# Patient Record
Sex: MALE | Race: BLACK OR AFRICAN AMERICAN | NOT HISPANIC OR LATINO | URBAN - METROPOLITAN AREA
[De-identification: names, ages, dates, MRNs, and addresses within clinical notes are randomized per-mention and may not be internally consistent; named-entity substitution may affect disease eponyms.]

---
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
## 2022-01-02 ENCOUNTER — EMERGENCY (EMERGENCY)
Facility: HOSPITAL | Age: 22
LOS: 0 days | Discharge: HOME | End: 2022-01-02
Payer: MEDICAID

## 2022-01-02 VITALS
HEART RATE: 61 BPM | TEMPERATURE: 98 F | DIASTOLIC BLOOD PRESSURE: 80 MMHG | OXYGEN SATURATION: 98 % | SYSTOLIC BLOOD PRESSURE: 126 MMHG | HEIGHT: 76 IN | WEIGHT: 270.07 LBS | RESPIRATION RATE: 19 BRPM

## 2022-01-02 DIAGNOSIS — Z20.822 CONTACT WITH AND (SUSPECTED) EXPOSURE TO COVID-19: ICD-10-CM

## 2022-01-02 DIAGNOSIS — U07.1 COVID-19: ICD-10-CM

## 2022-01-02 DIAGNOSIS — F90.9 ATTENTION-DEFICIT HYPERACTIVITY DISORDER, UNSPECIFIED TYPE: ICD-10-CM

## 2022-01-02 PROCEDURE — 99282 EMERGENCY DEPT VISIT SF MDM: CPT

## 2022-01-02 NOTE — ED PROVIDER NOTE - PATIENT PORTAL LINK FT
You can access the FollowMyHealth Patient Portal offered by [facility] by registering at the following website: [email or web address]. By joining [employer]’s FollowMyHealth portal, you will also be able to view your health information using other applications (apps) compatible with our system.

## 2022-01-02 NOTE — ED PROVIDER NOTE - OBJECTIVE STATEMENT
21y Male with no sig PMHx presents to the ER for covid test. Denies symptoms at this time. Had symptoms last week. requesting test to go back to work.

## 2022-01-02 NOTE — ED PROVIDER NOTE - ADDITIONAL NOTES AND INSTRUCTIONS:
If covid negative, may return to work once symptoms resolve. If covid positive, must quarantine for 5 days then may return to work if symptoms resolve but must wear mask for additional 5 days while around other people.

## 2022-01-02 NOTE — ED PROVIDER NOTE - NSICDXPASTMEDICALHX_GEN_ALL_CORE_FT
PAST MEDICAL HISTORY:  ADHD (attention deficit hyperactivity disorder)     Adopted By maternal aunt at 13years of age.    Slipped capital femoral epiphysis

## 2022-01-02 NOTE — ED PROVIDER NOTE - NSFOLLOWUPINSTRUCTIONS_ED_ALL_ED_FT
Today you were seen in the ER for covid swab.    Take Motrin 600 mg every 8 hours as needed for moderate pain or fevers -- take with food.    Take Tylenol 650mg (Two 325 mg pills) every 4-6 hours as needed for pain or fevers.    Quarantine until you receive a text with the results of your viral swab.     Continue all previously prescribed medications as directed unless otherwise instructed.     Follow up with your primary care physician in 48-72 hours- bring copies of your results.     Return to the ER for worsening or persistent symptoms, and/or ANY NEW OR CONCERNING SYMPTOMS including but not limited to persistent fever, chest pain, shortness of breath, difficulty breathing, inability to tolerate food/water.

## 2022-01-02 NOTE — ED ADULT NURSE NOTE - OBJECTIVE STATEMENT
pt requesting a covid test for work. A&Ox3. states he had flu like symptoms a few days ago. no symptoms at this time.

## 2022-01-02 NOTE — ED PROVIDER NOTE - NS ED ROS FT
Constitutional: (-) Fever, (-) Chills  Skin: (-) Rashes  Eyes: (-) Visual changes, (-) Discharge, (-) Redness  Ears: (-) Hearing loss, (-)Tinnitus, (-) Ear pain  Nose: (-) Nasal congestion, (-) Runny nose  Mouth/Throat: (-) Sore throat  CV: (-) Chest pain  Resp: (-) Cough, (-) Shortness of breath, (-) Dyspnea on Exertion, (-) Wheezing  GI: (-) Abdominal pain, (-) Nausea, (-) Vomiting, (-) Diarrhea  : (-) Dysuria   MSK: (-) Myalgias  Neuro: (-) Headache

## 2022-01-02 NOTE — ED PROVIDER NOTE - CLINICAL SUMMARY MEDICAL DECISION MAKING FREE TEXT BOX
21y Male with no sig PMHx presents to the ER for covid test. Denies symptoms at this time. Vital sign stable. Will swab and dc

## 2022-01-03 LAB — SARS-COV-2 RNA SPEC QL NAA+PROBE: DETECTED

## 2022-04-25 ENCOUNTER — INPATIENT (INPATIENT)
Facility: HOSPITAL | Age: 22
LOS: 1 days | Discharge: HOME | End: 2022-04-27
Attending: SURGERY | Admitting: SURGERY
Payer: MEDICAID

## 2022-04-25 VITALS
WEIGHT: 274.92 LBS | HEIGHT: 76 IN | DIASTOLIC BLOOD PRESSURE: 77 MMHG | TEMPERATURE: 98 F | OXYGEN SATURATION: 99 % | RESPIRATION RATE: 18 BRPM | SYSTOLIC BLOOD PRESSURE: 121 MMHG | HEART RATE: 75 BPM

## 2022-04-25 LAB
ALBUMIN SERPL ELPH-MCNC: 3.8 G/DL — SIGNIFICANT CHANGE UP (ref 3.3–5)
ALP SERPL-CCNC: 86 U/L — SIGNIFICANT CHANGE UP (ref 40–120)
ALT FLD-CCNC: 34 U/L — SIGNIFICANT CHANGE UP (ref 12–78)
ANION GAP SERPL CALC-SCNC: 4 MMOL/L — LOW (ref 5–17)
APPEARANCE UR: CLEAR — SIGNIFICANT CHANGE UP
AST SERPL-CCNC: 17 U/L — SIGNIFICANT CHANGE UP (ref 15–37)
BASOPHILS # BLD AUTO: 0.03 K/UL — SIGNIFICANT CHANGE UP (ref 0–0.2)
BASOPHILS NFR BLD AUTO: 0.3 % — SIGNIFICANT CHANGE UP (ref 0–2)
BILIRUB SERPL-MCNC: 1.2 MG/DL — SIGNIFICANT CHANGE UP (ref 0.2–1.2)
BILIRUB UR-MCNC: NEGATIVE — SIGNIFICANT CHANGE UP
BUN SERPL-MCNC: 10 MG/DL — SIGNIFICANT CHANGE UP (ref 7–23)
CALCIUM SERPL-MCNC: 9 MG/DL — SIGNIFICANT CHANGE UP (ref 8.5–10.1)
CHLORIDE SERPL-SCNC: 102 MMOL/L — SIGNIFICANT CHANGE UP (ref 96–108)
CO2 SERPL-SCNC: 29 MMOL/L — SIGNIFICANT CHANGE UP (ref 22–31)
COLOR SPEC: YELLOW — SIGNIFICANT CHANGE UP
CREAT SERPL-MCNC: 1.09 MG/DL — SIGNIFICANT CHANGE UP (ref 0.5–1.3)
DIFF PNL FLD: NEGATIVE — SIGNIFICANT CHANGE UP
EGFR: 100 ML/MIN/1.73M2 — SIGNIFICANT CHANGE UP
EOSINOPHIL # BLD AUTO: 0.06 K/UL — SIGNIFICANT CHANGE UP (ref 0–0.5)
EOSINOPHIL NFR BLD AUTO: 0.6 % — SIGNIFICANT CHANGE UP (ref 0–6)
GLUCOSE SERPL-MCNC: 91 MG/DL — SIGNIFICANT CHANGE UP (ref 70–99)
GLUCOSE UR QL: NEGATIVE MG/DL — SIGNIFICANT CHANGE UP
HCT VFR BLD CALC: 43.4 % — SIGNIFICANT CHANGE UP (ref 39–50)
HGB BLD-MCNC: 14.2 G/DL — SIGNIFICANT CHANGE UP (ref 13–17)
IMM GRANULOCYTES NFR BLD AUTO: 0.3 % — SIGNIFICANT CHANGE UP (ref 0–1.5)
KETONES UR-MCNC: NEGATIVE — SIGNIFICANT CHANGE UP
LEUKOCYTE ESTERASE UR-ACNC: NEGATIVE — SIGNIFICANT CHANGE UP
LIDOCAIN IGE QN: 78 U/L — SIGNIFICANT CHANGE UP (ref 73–393)
LYMPHOCYTES # BLD AUTO: 1.19 K/UL — SIGNIFICANT CHANGE UP (ref 1–3.3)
LYMPHOCYTES # BLD AUTO: 12.8 % — LOW (ref 13–44)
MCHC RBC-ENTMCNC: 28.8 PG — SIGNIFICANT CHANGE UP (ref 27–34)
MCHC RBC-ENTMCNC: 32.7 G/DL — SIGNIFICANT CHANGE UP (ref 32–36)
MCV RBC AUTO: 88 FL — SIGNIFICANT CHANGE UP (ref 80–100)
MONOCYTES # BLD AUTO: 1.17 K/UL — HIGH (ref 0–0.9)
MONOCYTES NFR BLD AUTO: 12.6 % — SIGNIFICANT CHANGE UP (ref 2–14)
NEUTROPHILS # BLD AUTO: 6.79 K/UL — SIGNIFICANT CHANGE UP (ref 1.8–7.4)
NEUTROPHILS NFR BLD AUTO: 73.4 % — SIGNIFICANT CHANGE UP (ref 43–77)
NITRITE UR-MCNC: NEGATIVE — SIGNIFICANT CHANGE UP
NRBC # BLD: 0 /100 WBCS — SIGNIFICANT CHANGE UP (ref 0–0)
PH UR: 7 — SIGNIFICANT CHANGE UP (ref 5–8)
PLATELET # BLD AUTO: 231 K/UL — SIGNIFICANT CHANGE UP (ref 150–400)
POTASSIUM SERPL-MCNC: 4.1 MMOL/L — SIGNIFICANT CHANGE UP (ref 3.5–5.3)
POTASSIUM SERPL-SCNC: 4.1 MMOL/L — SIGNIFICANT CHANGE UP (ref 3.5–5.3)
PROT SERPL-MCNC: 7.6 GM/DL — SIGNIFICANT CHANGE UP (ref 6–8.3)
PROT UR-MCNC: NEGATIVE MG/DL — SIGNIFICANT CHANGE UP
RBC # BLD: 4.93 M/UL — SIGNIFICANT CHANGE UP (ref 4.2–5.8)
RBC # FLD: 12.9 % — SIGNIFICANT CHANGE UP (ref 10.3–14.5)
SODIUM SERPL-SCNC: 135 MMOL/L — SIGNIFICANT CHANGE UP (ref 135–145)
SP GR SPEC: 1.01 — SIGNIFICANT CHANGE UP (ref 1.01–1.02)
UROBILINOGEN FLD QL: NEGATIVE MG/DL — SIGNIFICANT CHANGE UP
WBC # BLD: 9.27 K/UL — SIGNIFICANT CHANGE UP (ref 3.8–10.5)
WBC # FLD AUTO: 9.27 K/UL — SIGNIFICANT CHANGE UP (ref 3.8–10.5)

## 2022-04-25 PROCEDURE — 74177 CT ABD & PELVIS W/CONTRAST: CPT | Mod: 26,MA

## 2022-04-25 PROCEDURE — 99285 EMERGENCY DEPT VISIT HI MDM: CPT

## 2022-04-25 RX ORDER — IBUPROFEN 200 MG
600 TABLET ORAL ONCE
Refills: 0 | Status: COMPLETED | OUTPATIENT
Start: 2022-04-25 | End: 2022-04-25

## 2022-04-25 RX ORDER — METRONIDAZOLE 500 MG
500 TABLET ORAL ONCE
Refills: 0 | Status: DISCONTINUED | OUTPATIENT
Start: 2022-04-25 | End: 2022-04-26

## 2022-04-25 RX ORDER — CEFTRIAXONE 500 MG/1
1000 INJECTION, POWDER, FOR SOLUTION INTRAMUSCULAR; INTRAVENOUS ONCE
Refills: 0 | Status: COMPLETED | OUTPATIENT
Start: 2022-04-25 | End: 2022-04-25

## 2022-04-25 RX ORDER — ACETAMINOPHEN 500 MG
650 TABLET ORAL ONCE
Refills: 0 | Status: COMPLETED | OUTPATIENT
Start: 2022-04-25 | End: 2022-04-25

## 2022-04-25 RX ADMIN — Medication 650 MILLIGRAM(S): at 20:33

## 2022-04-25 RX ADMIN — CEFTRIAXONE 100 MILLIGRAM(S): 500 INJECTION, POWDER, FOR SOLUTION INTRAMUSCULAR; INTRAVENOUS at 23:52

## 2022-04-25 RX ADMIN — Medication 600 MILLIGRAM(S): at 19:47

## 2022-04-25 RX ADMIN — Medication 650 MILLIGRAM(S): at 19:47

## 2022-04-25 RX ADMIN — Medication 600 MILLIGRAM(S): at 20:33

## 2022-04-25 NOTE — ED ADULT NURSE NOTE - NSICDXPASTMEDICALHX_GEN_ALL_CORE_FT
PAST MEDICAL HISTORY:  ADHD (attention deficit hyperactivity disorder)     Adopted By maternal aunt at 13years of age.    Depression, major     Slipped capital femoral epiphysis

## 2022-04-25 NOTE — ED ADULT NURSE NOTE - OBJECTIVE STATEMENT
patient alert and oriented x3. pt c/o RLQ ABD pain that strated yesterday. pt tender on palpation to lower ABD. pt also reports that he has had difficulty urinating and an episode of dysuria, denies N/V/D, blood in urine/stool, chest pain, SOB.

## 2022-04-25 NOTE — ED ADULT TRIAGE NOTE - CHIEF COMPLAINT QUOTE
p/w right abd pain started yesterday, denies v/n/chills/fevers. burning with urination, denies hematuria.

## 2022-04-25 NOTE — H&P ADULT - HISTORY OF PRESENT ILLNESS
Patient is a 21 year old male with no PMH who presents today c/o 7/10 periumbilical pain radiating to RLQ x 8 hours. Admits to chills. Pain exacerbated with movement and coughing, although patient states he currently comfortable and only experiences pain when directly palpating the area. Tolerating regular diet; patient ate a full meal here at about 1030PM. Denies nausea/vomiting. +Flatus.   Denies fever, chest pain, sob, change in bowel habits, recent travel, sick contacts.

## 2022-04-25 NOTE — H&P ADULT - PROBLEM SELECTOR PLAN 1
Admit  Pt ate full meal at 1030PM, he also is refusing surgery at this time. Will therefore tx with IV antibiotics and monitor.   NPO  IV hydration  Pain meds PRN  F/u AM labs  Discussed with Dr. Thomas

## 2022-04-25 NOTE — ED PROVIDER NOTE - NS_ ATTENDINGSCRIBEDETAILS _ED_A_ED_FT
I agree with the scribe's documentation. I performed the history, physical exam, and medical decisionmaking

## 2022-04-25 NOTE — ED PROVIDER NOTE - CLINICAL SUMMARY MEDICAL DECISION MAKING FREE TEXT BOX
Pt w/ new onset worsening RLQ abdominal pain, possibly associated w/ dysuria. R/o UTI, r/o kidney stone. Likely presenting with constipation. Will obtain UA, basic labs, CT abdomen and pelvis without contrast. Will give Tylenol and Ibuprofen for pain and reassess. Pt w/ new onset worsening RLQ abdominal pain, possibly associated w/ dysuria. R/o UTI, r/o kidney stone. Likely presenting with constipation. Will obtain UA, basic labs, CT abdomen and pelvis without contrast. Will give Tylenol and Ibuprofen for pain and reassess.    Pain somewhat improved with interventions. CT read pending. Labs and UA unremarkable. Pt w/ new onset worsening RLQ abdominal pain, possibly associated w/ dysuria. R/o UTI, r/o kidney stone. Likely presenting with constipation. Will obtain UA, basic labs, CT abdomen and pelvis without contrast. Will give Tylenol and Ibuprofen for pain and reassess.    Pain somewhat improved with interventions. CT read pending. Labs and UA unremarkable.    CT shows acute appendicitis. Patient endorsed to general surgery team. Accepted for admission under Dr. Thomas. Pending covid swab

## 2022-04-25 NOTE — ED PROVIDER NOTE - OBJECTIVE STATEMENT
20 yo male w/ no PMH, presents to the ED for worsening RLQ abdominal pain since yesterday. Pt reports he has pain w/ urinating or laughing. Pain radiates to the umbilicus. No N/V/D, or fever. Pt reports having constipation w/ straining that started yesterday morning. Pt reports dysuria that is now resolved. C/o having a poor appetite. Pt was previously in normal state of health.

## 2022-04-26 ENCOUNTER — TRANSCRIPTION ENCOUNTER (OUTPATIENT)
Age: 22
End: 2022-04-26

## 2022-04-26 DIAGNOSIS — K35.80 UNSPECIFIED ACUTE APPENDICITIS: ICD-10-CM

## 2022-04-26 LAB
ANION GAP SERPL CALC-SCNC: 4 MMOL/L — LOW (ref 5–17)
APTT BLD: 33.8 SEC — SIGNIFICANT CHANGE UP (ref 27.5–35.5)
BUN SERPL-MCNC: 11 MG/DL — SIGNIFICANT CHANGE UP (ref 7–23)
CALCIUM SERPL-MCNC: 8.7 MG/DL — SIGNIFICANT CHANGE UP (ref 8.5–10.1)
CHLORIDE SERPL-SCNC: 104 MMOL/L — SIGNIFICANT CHANGE UP (ref 96–108)
CO2 SERPL-SCNC: 29 MMOL/L — SIGNIFICANT CHANGE UP (ref 22–31)
CREAT SERPL-MCNC: 1.03 MG/DL — SIGNIFICANT CHANGE UP (ref 0.5–1.3)
EGFR: 107 ML/MIN/1.73M2 — SIGNIFICANT CHANGE UP
FLUAV AG NPH QL: SIGNIFICANT CHANGE UP
FLUBV AG NPH QL: SIGNIFICANT CHANGE UP
GLUCOSE SERPL-MCNC: 97 MG/DL — SIGNIFICANT CHANGE UP (ref 70–99)
HCT VFR BLD CALC: 38.7 % — LOW (ref 39–50)
HGB BLD-MCNC: 13 G/DL — SIGNIFICANT CHANGE UP (ref 13–17)
INR BLD: 1.15 RATIO — SIGNIFICANT CHANGE UP (ref 0.88–1.16)
MAGNESIUM SERPL-MCNC: 2.1 MG/DL — SIGNIFICANT CHANGE UP (ref 1.6–2.6)
MCHC RBC-ENTMCNC: 29.3 PG — SIGNIFICANT CHANGE UP (ref 27–34)
MCHC RBC-ENTMCNC: 33.6 G/DL — SIGNIFICANT CHANGE UP (ref 32–36)
MCV RBC AUTO: 87.4 FL — SIGNIFICANT CHANGE UP (ref 80–100)
NRBC # BLD: 0 /100 WBCS — SIGNIFICANT CHANGE UP (ref 0–0)
PHOSPHATE SERPL-MCNC: 3.7 MG/DL — SIGNIFICANT CHANGE UP (ref 2.5–4.5)
PLATELET # BLD AUTO: 197 K/UL — SIGNIFICANT CHANGE UP (ref 150–400)
POTASSIUM SERPL-MCNC: 4 MMOL/L — SIGNIFICANT CHANGE UP (ref 3.5–5.3)
POTASSIUM SERPL-SCNC: 4 MMOL/L — SIGNIFICANT CHANGE UP (ref 3.5–5.3)
PROTHROM AB SERPL-ACNC: 13.8 SEC — HIGH (ref 10.5–13.4)
RBC # BLD: 4.43 M/UL — SIGNIFICANT CHANGE UP (ref 4.2–5.8)
RBC # FLD: 12.9 % — SIGNIFICANT CHANGE UP (ref 10.3–14.5)
SARS-COV-2 RNA SPEC QL NAA+PROBE: SIGNIFICANT CHANGE UP
SODIUM SERPL-SCNC: 137 MMOL/L — SIGNIFICANT CHANGE UP (ref 135–145)
WBC # BLD: 7.96 K/UL — SIGNIFICANT CHANGE UP (ref 3.8–10.5)
WBC # FLD AUTO: 7.96 K/UL — SIGNIFICANT CHANGE UP (ref 3.8–10.5)

## 2022-04-26 RX ORDER — MORPHINE SULFATE 50 MG/1
2 CAPSULE, EXTENDED RELEASE ORAL EVERY 4 HOURS
Refills: 0 | Status: DISCONTINUED | OUTPATIENT
Start: 2022-04-26 | End: 2022-04-27

## 2022-04-26 RX ORDER — ACETAMINOPHEN 500 MG
650 TABLET ORAL EVERY 6 HOURS
Refills: 0 | Status: DISCONTINUED | OUTPATIENT
Start: 2022-04-26 | End: 2022-04-27

## 2022-04-26 RX ORDER — SODIUM CHLORIDE 9 MG/ML
1000 INJECTION, SOLUTION INTRAVENOUS
Refills: 0 | Status: DISCONTINUED | OUTPATIENT
Start: 2022-04-26 | End: 2022-04-26

## 2022-04-26 RX ORDER — PIPERACILLIN AND TAZOBACTAM 4; .5 G/20ML; G/20ML
3.38 INJECTION, POWDER, LYOPHILIZED, FOR SOLUTION INTRAVENOUS ONCE
Refills: 0 | Status: COMPLETED | OUTPATIENT
Start: 2022-04-26 | End: 2022-04-26

## 2022-04-26 RX ORDER — HEPARIN SODIUM 5000 [USP'U]/ML
5000 INJECTION INTRAVENOUS; SUBCUTANEOUS EVERY 12 HOURS
Refills: 0 | Status: DISCONTINUED | OUTPATIENT
Start: 2022-04-26 | End: 2022-04-27

## 2022-04-26 RX ORDER — ONDANSETRON 8 MG/1
4 TABLET, FILM COATED ORAL EVERY 6 HOURS
Refills: 0 | Status: DISCONTINUED | OUTPATIENT
Start: 2022-04-26 | End: 2022-04-27

## 2022-04-26 RX ORDER — PIPERACILLIN AND TAZOBACTAM 4; .5 G/20ML; G/20ML
3.38 INJECTION, POWDER, LYOPHILIZED, FOR SOLUTION INTRAVENOUS
Refills: 0 | Status: DISCONTINUED | OUTPATIENT
Start: 2022-04-26 | End: 2022-04-27

## 2022-04-26 RX ADMIN — HEPARIN SODIUM 5000 UNIT(S): 5000 INJECTION INTRAVENOUS; SUBCUTANEOUS at 18:09

## 2022-04-26 RX ADMIN — HEPARIN SODIUM 5000 UNIT(S): 5000 INJECTION INTRAVENOUS; SUBCUTANEOUS at 01:23

## 2022-04-26 RX ADMIN — SODIUM CHLORIDE 125 MILLILITER(S): 9 INJECTION, SOLUTION INTRAVENOUS at 01:23

## 2022-04-26 RX ADMIN — PIPERACILLIN AND TAZOBACTAM 25 GRAM(S): 4; .5 INJECTION, POWDER, LYOPHILIZED, FOR SOLUTION INTRAVENOUS at 23:28

## 2022-04-26 RX ADMIN — PIPERACILLIN AND TAZOBACTAM 25 GRAM(S): 4; .5 INJECTION, POWDER, LYOPHILIZED, FOR SOLUTION INTRAVENOUS at 07:55

## 2022-04-26 RX ADMIN — SODIUM CHLORIDE 125 MILLILITER(S): 9 INJECTION, SOLUTION INTRAVENOUS at 04:22

## 2022-04-26 RX ADMIN — SODIUM CHLORIDE 125 MILLILITER(S): 9 INJECTION, SOLUTION INTRAVENOUS at 18:09

## 2022-04-26 RX ADMIN — PIPERACILLIN AND TAZOBACTAM 200 GRAM(S): 4; .5 INJECTION, POWDER, LYOPHILIZED, FOR SOLUTION INTRAVENOUS at 00:19

## 2022-04-26 RX ADMIN — PIPERACILLIN AND TAZOBACTAM 25 GRAM(S): 4; .5 INJECTION, POWDER, LYOPHILIZED, FOR SOLUTION INTRAVENOUS at 16:23

## 2022-04-26 NOTE — DISCHARGE NOTE PROVIDER - HOSPITAL COURSE
21 year old male with no PMH who presents today c/o 7/10 periumbilical pain radiating to RLQ x 8 hours. Admits to chills. Pain exacerbated with movement and coughing, although patient states he currently comfortable and only experiences pain when directly palpating the area. Tolerating regular diet; patient ate a full meal here at about 1030PM. Denies nausea/vomiting. +Flatus. Denies fever, chest pain, sob, change in bowel habits, recent travel, sick contacts. Admitted to surgical service with early appendicitis. Hospital course unremarkable, patient with no white count and afebrile. Stable for discharge from surgical standpoint.

## 2022-04-26 NOTE — PATIENT PROFILE ADULT - FALL HARM RISK - UNIVERSAL INTERVENTIONS
Bed in lowest position, wheels locked, appropriate side rails in place/Call bell, personal items and telephone in reach/Instruct patient to call for assistance before getting out of bed or chair/Non-slip footwear when patient is out of bed/[place] to call system/Physically safe environment - no spills, clutter or unnecessary equipment/Purposeful Proactive Rounding/Room/bathroom lighting operational, light cord in reach

## 2022-04-26 NOTE — DISCHARGE NOTE PROVIDER - CARE PROVIDER_API CALL
Tanvi Thomas)  Surgery  [address] [place] [address]  Phone: [phone]  Fax: [phone]  Follow Up Time: 2 weeks

## 2022-04-27 ENCOUNTER — TRANSCRIPTION ENCOUNTER (OUTPATIENT)
Age: 22
End: 2022-04-27

## 2022-04-27 VITALS
SYSTOLIC BLOOD PRESSURE: 105 MMHG | RESPIRATION RATE: 17 BRPM | DIASTOLIC BLOOD PRESSURE: 64 MMHG | HEART RATE: 66 BPM | TEMPERATURE: 98 F | OXYGEN SATURATION: 98 %

## 2022-04-27 LAB
CULTURE RESULTS: SIGNIFICANT CHANGE UP
SPECIMEN SOURCE: SIGNIFICANT CHANGE UP

## 2022-04-27 RX ADMIN — HEPARIN SODIUM 5000 UNIT(S): 5000 INJECTION INTRAVENOUS; SUBCUTANEOUS at 05:20

## 2022-04-27 RX ADMIN — PIPERACILLIN AND TAZOBACTAM 25 GRAM(S): 4; .5 INJECTION, POWDER, LYOPHILIZED, FOR SOLUTION INTRAVENOUS at 07:54

## 2022-04-27 NOTE — DISCHARGE NOTE NURSING/CASE MANAGEMENT/SOCIAL WORK - PATIENT PORTAL LINK FT
You can access the FollowMyHealth Patient Portal offered by [facility] by registering at the following website: [email or web address]. By joining [employer]’s FollowMyHealth portal, you will also be able to view your health information using other applications (apps) compatible with our system.

## 2022-04-27 NOTE — CHART NOTE - NSCHARTNOTEFT_GEN_A_CORE
To Whom it May Concern,     Rosalina Busby was admitted to our facility from April 25-27.  He may return to work when cleared by his surgeon at his follow up appointment.  Please feel free to reach out with any questions.    Thank you,  CAMRYN Kemp  Department of Surgery  [facility]  [phone].

## 2022-04-27 NOTE — PROGRESS NOTE ADULT - SUBJECTIVE AND OBJECTIVE BOX
Patient seen and examined at bedside in no distress.  Reports improvement of abdominal pain. Denies n/v.  Denies fever, chills, chest pain, sob.    Vital Signs Last 24 Hrs  T(F): 97.8 (04-26-22 @ 04:17), Max: 98.5 (04-26-22 @ 00:16)  HR: 60 (04-26-22 @ 04:17)  BP: 128/74 (04-26-22 @ 04:17)  RR: 18 (04-26-22 @ 04:17)  SpO2: 100% (04-26-22 @ 04:17)    GENERAL: Alert, oriented, NAD  CHEST/LUNG: Clear to auscultation bilaterally, respirations nonlabored  HEART: S1S2, RRR  ABDOMEN: +BS, soft, nondistended, RLQ tenderness to deep palpation, no guarding, no rigidity  EXTREMITIES: No pedal edema b/l     LABS:                        13.0   7.96  )-----------( 197      ( 26 Apr 2022 05:32 )             38.7     Remainder of labs pending    A/P: 21M a/w early appendicitis  -- continue antibiotics  -- pain meds prn  -- will advance diet later today and possibly d/c home on oral abx this evening  -- f/u remainder of AM labs   
Patient seen and examined at bedside in no distress.  Feels well. RLQ pain completely resolved.  Tolerated clears, has not yet had regular food.  Denies fever, chills, chest pain, sob.    Vital Signs Last 24 Hrs  T(F): 99.5 (04-26-22 @ 23:30), Max: 99.5 (04-26-22 @ 23:30)  HR: 67 (04-26-22 @ 23:30)  BP: 114/69 (04-26-22 @ 23:30)  RR: 19 (04-26-22 @ 23:30)  SpO2: 98% (04-26-22 @ 23:30)    GENERAL: Alert, oriented, NAD  CHEST/LUNG: Clear to auscultation bilaterally, respirations nonlabored  HEART: S1S2, RRR  ABDOMEN: + Bowel sounds, soft, NTND  EXTREMITIES: No pedal edema b/l       LABS:  Pending    A/P: 21M a/w early appendicitis  -- regular diet  -- d/c home with oral antibiotics  -- OP f/u

## 2022-04-27 NOTE — DISCHARGE NOTE NURSING/CASE MANAGEMENT/SOCIAL WORK - NSDPDISTO_GEN_ALL_CORE
Telephone Encounter by Nata Winslow RN at 05/01/18 08:13 AM     Author:  Nata Winslow RN Service:  (none) Author Type:  Registered Nurse     Filed:  05/01/18 08:14 AM Encounter Date:  5/1/2018 Status:  Signed     :  Nata Winslow RN (Registered Nurse)              DARLYN ANAYA    Patient Age: 89 year old    ACCT STATUS:   MESSAGE:[LI1.1T]   Spoke to daughter,Neo and confirmed appt in [facility] for tomorrow at 1:00 pm[LI1.1M]   Next and Last Visit with Provider and Department  Next visit with DIEGO JESUS is on 05/02/2018 at  1:30 PM in FAMILY PRACTICE SEQ  Next visit with FAMILY PRACTICE is on 05/02/2018 at  1:30 PM in [facility]ILY [facility]ICE SEQ  Last visit with DIEGO JESUS was on 08/08/2017 at  2:30 PM in FAMILY [facility]ACTICE SEQ  Last visit with FAMILY PRACTICE was on 08/08/2017 at  2:30 PM in FAMILY PRACTICE SEQ     WEIGHT AND HEIGHT: As of 11/27/2017 weight is 117 lbs.(53.071 kg). Height is 5' 0\"(1.524 m).   BMI is 22.85 kg/(m^2) calculated from:     Height 5' 0\" (1.524 m) as of 11/27/17     Weight 117 lb (53.071 kg) as of 11/27/17      Allergies      Allergen   Reactions   • Advicor     • Dilantin [Phenytoin]  Itching     mostly around the head area      Current outpatient prescriptions       Medication  Sig Dispense Refill   • sertraline (ZOLOFT) 100 MG tablet TAKE 1 TABLET BY MOUTH DAILY 90 Tab 0   • trazodone (DESYREL) 50 MG tablet TAKE 1 TABLET BY MOUTH EVERY NIGHT 90 Tab 0   • omeprazole (PRILOSEC) 40 MG Cap Take 1 Cap by mouth daily. 90 Cap 1   • memantine (NAMENDA) 5 MG tablet Take 1 Tab by mouth 2 (two) times daily. 60 Tab 3   • amlodipine (NORVASC) 5 MG tablet TAKE 1 TABLET BY MOUTH DAILY 90 Tab 0   • Melatonin 10 MG CAPS Take  by mouth.     • cholestyramine light 4 G packet Take 1 Packet by mouth 2 (two) times daily. 60 Each 11   • CREON 6000 UNITS CPEP TAKE 1 CAPSULE BY MOUTH THREE TIMES DAILY WITH MEALS 270 Cap 1   • TYLENOL OR tylenol pm prn        PHARMACY to use:            Pharmacy preference(s) on file:    [facility]  [address] [facility] [address] [facility] [address]     CALL BACK INFO:[LI1.1T] DO NOT LEAVE A MESSAGE - contact patient directly[LI1.1M]  ROUTING:[LI1.1T] Patient's physician/staff[LI1.1M]        PCP: Tara Liu DO         INS: Payor: MEDICARE - ASSIGNED / Plan: *No Plan* / Product Type: *No Product type* / Note: This is the primary coverage, but no account was found for this location or the patient's primary location.   ADDRESS:  [address] [place] [address] [address]  [address]  [facility] [address][LI1.1T]         Revision History        User Key Date/Time User Provider Type Action    > LI1.1 05/01/18 08:14 AM Nata Winslow, RN Registered Nurse Sign    M - Manual, T - Template             Home

## 2022-05-02 DIAGNOSIS — K35.80 UNSPECIFIED ACUTE APPENDICITIS: ICD-10-CM

## 2022-08-09 ENCOUNTER — NON-APPOINTMENT (OUTPATIENT)
Age: 22
End: 2022-08-09

## 2022-09-14 ENCOUNTER — NON-APPOINTMENT (OUTPATIENT)
Age: 22
End: 2022-09-14

## 2022-10-06 NOTE — ED ADULT NURSE NOTE - SUICIDE SCREENING DEPRESSION
Negative Consent 3/Introductory Paragraph: I gave the patient a chance to ask questions they had about the procedure.  Following this I explained the Mohs procedure and consent was obtained. The risks, benefits and alternatives to therapy were discussed in detail. Specifically, the risks of infection, scarring, bleeding, prolonged wound healing, incomplete removal, allergy to anesthesia, nerve injury and recurrence were addressed. Prior to the procedure, the treatment site was clearly identified and confirmed by the patient. All components of Universal Protocol/PAUSE Rule completed.

## 2022-12-18 ENCOUNTER — EMERGENCY (EMERGENCY)
Facility: HOSPITAL | Age: 22
LOS: 1 days | Discharge: HOME | End: 2022-12-18
Attending: EMERGENCY MEDICINE | Admitting: EMERGENCY MEDICINE

## 2022-12-18 VITALS
OXYGEN SATURATION: 98 % | DIASTOLIC BLOOD PRESSURE: 74 MMHG | HEART RATE: 65 BPM | RESPIRATION RATE: 16 BRPM | TEMPERATURE: 97 F | SYSTOLIC BLOOD PRESSURE: 152 MMHG

## 2022-12-18 DIAGNOSIS — F12.129 CANNABIS ABUSE WITH INTOXICATION, UNSPECIFIED: ICD-10-CM

## 2022-12-18 DIAGNOSIS — R11.10 VOMITING, UNSPECIFIED: ICD-10-CM

## 2022-12-18 LAB
ANION GAP SERPL CALC-SCNC: 12 MMOL/L — SIGNIFICANT CHANGE UP (ref 9–16)
APTT BLD: 30 SEC — SIGNIFICANT CHANGE UP (ref 27.5–35.5)
BASOPHILS # BLD AUTO: 0.07 K/UL — SIGNIFICANT CHANGE UP (ref 0–0.2)
BASOPHILS NFR BLD AUTO: 0.5 % — SIGNIFICANT CHANGE UP (ref 0–2)
BUN SERPL-MCNC: 23 MG/DL — SIGNIFICANT CHANGE UP (ref 7–23)
CALCIUM SERPL-MCNC: 9.5 MG/DL — SIGNIFICANT CHANGE UP (ref 8.5–10.5)
CHLORIDE SERPL-SCNC: 102 MMOL/L — SIGNIFICANT CHANGE UP (ref 96–108)
CO2 SERPL-SCNC: 25 MMOL/L — SIGNIFICANT CHANGE UP (ref 22–31)
CREAT SERPL-MCNC: 1.19 MG/DL — SIGNIFICANT CHANGE UP (ref 0.5–1.3)
EGFR: 89 ML/MIN/1.73M2 — SIGNIFICANT CHANGE UP
EOSINOPHIL # BLD AUTO: 0.12 K/UL — SIGNIFICANT CHANGE UP (ref 0–0.5)
EOSINOPHIL NFR BLD AUTO: 0.8 % — SIGNIFICANT CHANGE UP (ref 0–6)
GLUCOSE SERPL-MCNC: 156 MG/DL — HIGH (ref 70–99)
HCT VFR BLD CALC: 43.8 % — SIGNIFICANT CHANGE UP (ref 39–50)
HGB BLD-MCNC: 14.2 G/DL — SIGNIFICANT CHANGE UP (ref 13–17)
HIV 1 & 2 AB SERPL IA.RAPID: SIGNIFICANT CHANGE UP
IMM GRANULOCYTES NFR BLD AUTO: 0.4 % — SIGNIFICANT CHANGE UP (ref 0–0.9)
INR BLD: 1.17 — HIGH (ref 0.88–1.16)
LYMPHOCYTES # BLD AUTO: 19.8 % — SIGNIFICANT CHANGE UP (ref 13–44)
LYMPHOCYTES # BLD AUTO: 2.81 K/UL — SIGNIFICANT CHANGE UP (ref 1–3.3)
MAGNESIUM SERPL-MCNC: 1.9 MG/DL — SIGNIFICANT CHANGE UP (ref 1.6–2.6)
MCHC RBC-ENTMCNC: 29.3 PG — SIGNIFICANT CHANGE UP (ref 27–34)
MCHC RBC-ENTMCNC: 32.4 GM/DL — SIGNIFICANT CHANGE UP (ref 32–36)
MCV RBC AUTO: 90.3 FL — SIGNIFICANT CHANGE UP (ref 80–100)
MONOCYTES # BLD AUTO: 1.11 K/UL — HIGH (ref 0–0.9)
MONOCYTES NFR BLD AUTO: 7.8 % — SIGNIFICANT CHANGE UP (ref 2–14)
NEUTROPHILS # BLD AUTO: 10.02 K/UL — HIGH (ref 1.8–7.4)
NEUTROPHILS NFR BLD AUTO: 70.7 % — SIGNIFICANT CHANGE UP (ref 43–77)
NRBC # BLD: 0 /100 WBCS — SIGNIFICANT CHANGE UP (ref 0–0)
PLATELET # BLD AUTO: 235 K/UL — SIGNIFICANT CHANGE UP (ref 150–400)
POTASSIUM SERPL-MCNC: 3.9 MMOL/L — SIGNIFICANT CHANGE UP (ref 3.5–5.3)
POTASSIUM SERPL-SCNC: 3.9 MMOL/L — SIGNIFICANT CHANGE UP (ref 3.5–5.3)
PROTHROM AB SERPL-ACNC: 13.7 SEC — HIGH (ref 10.5–13.4)
RBC # BLD: 4.85 M/UL — SIGNIFICANT CHANGE UP (ref 4.2–5.8)
RBC # FLD: 13.1 % — SIGNIFICANT CHANGE UP (ref 10.3–14.5)
SODIUM SERPL-SCNC: 139 MMOL/L — SIGNIFICANT CHANGE UP (ref 132–145)
TROPONIN I, HIGH SENSITIVITY RESULT: 5.7 NG/L — SIGNIFICANT CHANGE UP
WBC # BLD: 14.19 K/UL — HIGH (ref 3.8–10.5)
WBC # FLD AUTO: 14.19 K/UL — HIGH (ref 3.8–10.5)

## 2022-12-18 PROCEDURE — 99285 EMERGENCY DEPT VISIT HI MDM: CPT

## 2022-12-18 PROCEDURE — 93010 ELECTROCARDIOGRAM REPORT: CPT

## 2022-12-18 RX ORDER — ONDANSETRON 8 MG/1
4 TABLET, FILM COATED ORAL ONCE
Refills: 0 | Status: COMPLETED | OUTPATIENT
Start: 2022-12-18 | End: 2022-12-18

## 2022-12-18 RX ORDER — SODIUM CHLORIDE 9 MG/ML
1000 INJECTION INTRAMUSCULAR; INTRAVENOUS; SUBCUTANEOUS ONCE
Refills: 0 | Status: COMPLETED | OUTPATIENT
Start: 2022-12-18 | End: 2022-12-18

## 2022-12-18 RX ADMIN — SODIUM CHLORIDE 1000 MILLILITER(S): 9 INJECTION INTRAMUSCULAR; INTRAVENOUS; SUBCUTANEOUS at 22:22

## 2022-12-18 RX ADMIN — ONDANSETRON 4 MILLIGRAM(S): 8 TABLET, FILM COATED ORAL at 21:51

## 2022-12-18 NOTE — ED PROVIDER NOTE - OBJECTIVE STATEMENT
Patient is a 22-year-old male with no past medical history who is brought in by EMS for nausea vomiting and palpitations after ingesting a marijuana edible.  Patient reports labile said 200 mg but unclear if it is THC or some other cannabis derivative.  Denies any chest pain or shortness of breath.  Denies any abdominal pain.  Patient is very drowsy and placed in stretcher and quickly falls asleep.
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
## 2022-12-18 NOTE — ED ADULT NURSE NOTE - OBJECTIVE STATEMENT
Patient presents to the ED c/o marijuana intoxication. S&S include N/V. Patient is currently resting in NAD. Denies any needs at this time. Will continue to monitor.

## 2022-12-18 NOTE — ED PROVIDER NOTE - CLINICAL SUMMARY MEDICAL DECISION MAKING FREE TEXT BOX
Cannibis intoxication/overdose.  Will observe closely while he metabolizes.  EKG with no arrythmia and likely J point.  Will check labs and give IV hydration.  Zofran given for nausea/vomiting on arrival.

## 2022-12-18 NOTE — ED ADULT TRIAGE NOTE - CHIEF COMPLAINT QUOTE
BIBEMS for altered mental status s/p eating edible candy. EMS reports it was 200 mg edible sour candy. +nausea, + palpitations, no signs of injury. responsive to verbal stimuli

## 2022-12-18 NOTE — ED PROVIDER NOTE - PHYSICAL EXAMINATION
VITAL SIGNS: I have reviewed nursing notes and confirm.  CONSTITUTIONAL: Well-developed; well-nourished; drowsy but arouses easily.  No active vomiting but EMS reports +vomiting en route.   SKIN: Skin is warm and dry, no acute rash.  HEAD: Normocephalic; atraumatic.  EYES: Dilated b/l with PERRL, EOM intact; conjunctiva and sclera injected.   ENT: No nasal discharge; airway clear.  NECK: Supple; non tender.  CARD: S1, S2 normal; no murmurs, gallops, or rubs. Regular rate and rhythm.  RESP: No wheezes, rales or rhonchi.  ABD: Normal bowel sounds; soft; non-distended; non-tender; no hepatosplenomegaly.  MSK: Normal ROM. No clubbing, cyanosis or edema.  NEURO: Alert, oriented. Grossly unremarkable.

## 2022-12-18 NOTE — ED PROVIDER NOTE - PATIENT PORTAL LINK FT
You can access the FollowMyHealth Patient Portal offered by [facility] by registering at the following website: [email or web address]. By joining [facility]’s FollowMyHealth portal, you will also be able to view your health information using other applications (apps) compatible with our system.

## 2022-12-19 VITALS
OXYGEN SATURATION: 95 % | DIASTOLIC BLOOD PRESSURE: 70 MMHG | RESPIRATION RATE: 18 BRPM | TEMPERATURE: 98 F | SYSTOLIC BLOOD PRESSURE: 111 MMHG | HEART RATE: 73 BPM

## 2022-12-23 PROBLEM — F32.9 MAJOR DEPRESSIVE DISORDER, SINGLE EPISODE, UNSPECIFIED: Chronic | Status: ACTIVE | Noted: 2022-04-25

## 2023-01-13 ENCOUNTER — NON-APPOINTMENT (OUTPATIENT)
Age: 23
End: 2023-01-13

## 2023-03-24 ENCOUNTER — NON-APPOINTMENT (OUTPATIENT)
Age: 23
End: 2023-03-24

## 2023-03-27 NOTE — DISCHARGE NOTE NURSING/CASE MANAGEMENT/SOCIAL WORK - NSDCPEFALRISK_GEN_ALL_CORE
For information on Fall & Injury Prevention, visit: [email or web address] OR  [email or web address] OR  https://www.cdc.gov/steadi/patient.html
Living Will

## 2023-04-03 NOTE — ED PROVIDER NOTE - CARDIAC, MLM
Detail Level: Detailed Detail Level: Simple Detail Level: Generalized Normal rate, regular rhythm.  Heart sounds S1, S2.  No murmurs, rubs or gallops.

## 2024-02-06 NOTE — ED ADULT TRIAGE NOTE - ESI TRIAGE ACUITY LEVEL, MLM
REMOTE DEVICE CHECK:  [employer] Transmission 2023  Medtronic BiV ICD, 2021 battery RECALL  Hx NICMP, PAT  The remote device transmission shows NORMAL FUNCTION.  RRT 2024    Alert transmission for RRT on 2024    Presenting Rhythm:  ASBiVP 72 bpm  Battery Voltage/Longevity:  RRT 2024  Percent Pacin.8% ASVP  Tachy Detections/Episodes: None.  2 VS episodes, 4-12 sec, 109-115 bpm, 1 availableEGM/markers only suggests PAT.    Intracardiac Monitoring:  The ICM was reviewed and shows no fluid accumulation, subthreshold    See device parameters/results in the Media    Dr. Shepherd reviewed the device transmission results  Follow up:  None.      As of 2021, [employer] issued a battery advisory, upgraded to RECALL by FDA Class I on 2021, on a subset of ICD and CRT-D devices manufactured before 2019.  The device may experience a shortened RRT to EOS interval following an earlier than expected RRT with a 14 day median.  The patient is not pacemaker dependent and remote connectivity verified.     Message sent to Dr Shepherd RN message pool re RRT          
3

## 2025-03-18 NOTE — DISCHARGE NOTE PROVIDER - REASON FOR ADMISSION
[Time Spent: ___ minutes] : I have spent [unfilled] minutes of time on the encounter which excludes teaching and separately reported services. acute appendicitis